# Patient Record
Sex: MALE | Race: WHITE | NOT HISPANIC OR LATINO
[De-identification: names, ages, dates, MRNs, and addresses within clinical notes are randomized per-mention and may not be internally consistent; named-entity substitution may affect disease eponyms.]

---

## 2017-10-27 ENCOUNTER — TRANSCRIPTION ENCOUNTER (OUTPATIENT)
Age: 22
End: 2017-10-27

## 2020-05-25 ENCOUNTER — TRANSCRIPTION ENCOUNTER (OUTPATIENT)
Age: 25
End: 2020-05-25

## 2020-08-25 ENCOUNTER — OUTPATIENT (OUTPATIENT)
Dept: OUTPATIENT SERVICES | Facility: HOSPITAL | Age: 25
LOS: 1 days | End: 2020-08-25
Payer: COMMERCIAL

## 2020-08-25 DIAGNOSIS — Z11.59 ENCOUNTER FOR SCREENING FOR OTHER VIRAL DISEASES: ICD-10-CM

## 2020-08-25 LAB — SARS-COV-2 RNA SPEC QL NAA+PROBE: SIGNIFICANT CHANGE UP

## 2020-08-25 PROCEDURE — U0003: CPT

## 2020-08-26 DIAGNOSIS — Z11.59 ENCOUNTER FOR SCREENING FOR OTHER VIRAL DISEASES: ICD-10-CM

## 2020-12-12 ENCOUNTER — TRANSCRIPTION ENCOUNTER (OUTPATIENT)
Age: 25
End: 2020-12-12

## 2021-01-06 ENCOUNTER — TRANSCRIPTION ENCOUNTER (OUTPATIENT)
Age: 26
End: 2021-01-06

## 2021-01-08 ENCOUNTER — TRANSCRIPTION ENCOUNTER (OUTPATIENT)
Age: 26
End: 2021-01-08

## 2021-01-09 ENCOUNTER — EMERGENCY (EMERGENCY)
Facility: HOSPITAL | Age: 26
LOS: 1 days | End: 2021-01-09
Attending: EMERGENCY MEDICINE
Payer: MEDICAID

## 2021-01-09 ENCOUNTER — TRANSCRIPTION ENCOUNTER (OUTPATIENT)
Age: 26
End: 2021-01-09

## 2021-01-09 VITALS
DIASTOLIC BLOOD PRESSURE: 71 MMHG | HEART RATE: 81 BPM | TEMPERATURE: 98 F | SYSTOLIC BLOOD PRESSURE: 109 MMHG | RESPIRATION RATE: 18 BRPM | HEIGHT: 71 IN | WEIGHT: 169.98 LBS | OXYGEN SATURATION: 97 %

## 2021-01-09 LAB
ALBUMIN SERPL ELPH-MCNC: 4.2 G/DL — SIGNIFICANT CHANGE UP (ref 3.3–5)
ALP SERPL-CCNC: 44 U/L — SIGNIFICANT CHANGE UP (ref 40–120)
ALT FLD-CCNC: 15 U/L — SIGNIFICANT CHANGE UP (ref 10–45)
ANION GAP SERPL CALC-SCNC: 10 MMOL/L — SIGNIFICANT CHANGE UP (ref 5–17)
APTT BLD: 32.4 SEC — SIGNIFICANT CHANGE UP (ref 27.5–35.5)
AST SERPL-CCNC: 16 U/L — SIGNIFICANT CHANGE UP (ref 10–40)
BASOPHILS # BLD AUTO: 0.04 K/UL — SIGNIFICANT CHANGE UP (ref 0–0.2)
BASOPHILS NFR BLD AUTO: 0.3 % — SIGNIFICANT CHANGE UP (ref 0–2)
BILIRUB SERPL-MCNC: 0.4 MG/DL — SIGNIFICANT CHANGE UP (ref 0.2–1.2)
BLD GP AB SCN SERPL QL: NEGATIVE — SIGNIFICANT CHANGE UP
BUN SERPL-MCNC: 7 MG/DL — SIGNIFICANT CHANGE UP (ref 7–23)
CALCIUM SERPL-MCNC: 9.5 MG/DL — SIGNIFICANT CHANGE UP (ref 8.4–10.5)
CHLORIDE SERPL-SCNC: 103 MMOL/L — SIGNIFICANT CHANGE UP (ref 96–108)
CO2 SERPL-SCNC: 27 MMOL/L — SIGNIFICANT CHANGE UP (ref 22–31)
CREAT SERPL-MCNC: 0.74 MG/DL — SIGNIFICANT CHANGE UP (ref 0.5–1.3)
EOSINOPHIL # BLD AUTO: 0.03 K/UL — SIGNIFICANT CHANGE UP (ref 0–0.5)
EOSINOPHIL NFR BLD AUTO: 0.3 % — SIGNIFICANT CHANGE UP (ref 0–6)
GLUCOSE SERPL-MCNC: 67 MG/DL — LOW (ref 70–99)
HCT VFR BLD CALC: 45.4 % — SIGNIFICANT CHANGE UP (ref 39–50)
HGB BLD-MCNC: 14.9 G/DL — SIGNIFICANT CHANGE UP (ref 13–17)
IMM GRANULOCYTES NFR BLD AUTO: 0.3 % — SIGNIFICANT CHANGE UP (ref 0–1.5)
INR BLD: 1.18 RATIO — HIGH (ref 0.88–1.16)
LYMPHOCYTES # BLD AUTO: 1.97 K/UL — SIGNIFICANT CHANGE UP (ref 1–3.3)
LYMPHOCYTES # BLD AUTO: 16.9 % — SIGNIFICANT CHANGE UP (ref 13–44)
MCHC RBC-ENTMCNC: 31 PG — SIGNIFICANT CHANGE UP (ref 27–34)
MCHC RBC-ENTMCNC: 32.8 GM/DL — SIGNIFICANT CHANGE UP (ref 32–36)
MCV RBC AUTO: 94.6 FL — SIGNIFICANT CHANGE UP (ref 80–100)
MONOCYTES # BLD AUTO: 1.14 K/UL — HIGH (ref 0–0.9)
MONOCYTES NFR BLD AUTO: 9.8 % — SIGNIFICANT CHANGE UP (ref 2–14)
NEUTROPHILS # BLD AUTO: 8.42 K/UL — HIGH (ref 1.8–7.4)
NEUTROPHILS NFR BLD AUTO: 72.4 % — SIGNIFICANT CHANGE UP (ref 43–77)
NRBC # BLD: 0 /100 WBCS — SIGNIFICANT CHANGE UP (ref 0–0)
PLATELET # BLD AUTO: 242 K/UL — SIGNIFICANT CHANGE UP (ref 150–400)
POTASSIUM SERPL-MCNC: 3.8 MMOL/L — SIGNIFICANT CHANGE UP (ref 3.5–5.3)
POTASSIUM SERPL-SCNC: 3.8 MMOL/L — SIGNIFICANT CHANGE UP (ref 3.5–5.3)
PROT SERPL-MCNC: 7.6 G/DL — SIGNIFICANT CHANGE UP (ref 6–8.3)
PROTHROM AB SERPL-ACNC: 14 SEC — HIGH (ref 10.6–13.6)
RBC # BLD: 4.8 M/UL — SIGNIFICANT CHANGE UP (ref 4.2–5.8)
RBC # FLD: 12.9 % — SIGNIFICANT CHANGE UP (ref 10.3–14.5)
RH IG SCN BLD-IMP: POSITIVE — SIGNIFICANT CHANGE UP
SARS-COV-2 RNA SPEC QL NAA+PROBE: SIGNIFICANT CHANGE UP
SODIUM SERPL-SCNC: 140 MMOL/L — SIGNIFICANT CHANGE UP (ref 135–145)
WBC # BLD: 11.64 K/UL — HIGH (ref 3.8–10.5)
WBC # FLD AUTO: 11.64 K/UL — HIGH (ref 3.8–10.5)

## 2021-01-09 PROCEDURE — 73090 X-RAY EXAM OF FOREARM: CPT | Mod: 26,LT

## 2021-01-09 PROCEDURE — 73110 X-RAY EXAM OF WRIST: CPT | Mod: 26,LT

## 2021-01-09 PROCEDURE — 73130 X-RAY EXAM OF HAND: CPT | Mod: 26,LT

## 2021-01-09 PROCEDURE — 99218: CPT

## 2021-01-09 RX ORDER — CLONAZEPAM 1 MG
0.5 TABLET ORAL
Refills: 0 | Status: COMPLETED | OUTPATIENT
Start: 2021-01-09 | End: 2021-01-16

## 2021-01-09 RX ORDER — VANCOMYCIN HCL 1 G
1000 VIAL (EA) INTRAVENOUS ONCE
Refills: 0 | Status: COMPLETED | OUTPATIENT
Start: 2021-01-09 | End: 2021-01-09

## 2021-01-09 RX ORDER — IBUPROFEN 200 MG
600 TABLET ORAL EVERY 6 HOURS
Refills: 0 | Status: DISCONTINUED | OUTPATIENT
Start: 2021-01-09 | End: 2021-01-13

## 2021-01-09 RX ADMIN — Medication 600 MILLIGRAM(S): at 21:35

## 2021-01-09 RX ADMIN — Medication 0.5 MILLIGRAM(S): at 19:53

## 2021-01-09 RX ADMIN — Medication 250 MILLIGRAM(S): at 18:30

## 2021-01-09 RX ADMIN — Medication 100 MILLIGRAM(S): at 16:58

## 2021-01-09 NOTE — ED ADULT TRIAGE NOTE - PAIN: PRESENCE, MLM
Same Day Surgery Discharge Instructions  Special Precautions After Surgery - Adult    1. It is not unusual to feel lightheaded or faint, up to 24 hours after surgery or while taking pain medication.  If you have these symptoms; sit for a few minutes before standing and have someone assist you when getting up.  2. You should rest and relax for the next 24 hours and must have someone stay with you for at least 24 hours after your discharge.  3. DO NOT DRIVE any vehicle or operate mechanical equipment for 24 hours following the end of your surgery.  DO NOT DRIVE while taking narcotic pain medications that have been prescribed by your physician.  If you had a limb operated on, you must be able to use it fully to drive.  4. DO NOT drink alcoholic beverages for 24 hours following surgery or while taking prescription pain medication.  5. Drink clear liquids (apple juice, ginger ale, broth, 7-Up, etc.).  Progress to your regular diet as you feel able.  6. Any questions call your physician and do not make important decisions for 24 hours.      __________________________________________________________________________________________________________________________________  IMPORTANT NUMBERS:    [facility] [place]:  [phone], [phone]  Pharmacy:  [phone]  Same Day Surgery:  [phone], Monday - Friday until 8:30 p.m.  Urgent Care:  [phone]  Emergency Room:  [phone]      [facility]:  [phone]                                                                             [facility]:  [phone] option 1  [facility]:  [phone]     [facility]:  [phone]   [facility]:  [phone]   Home Medical Equipment: [phone]  [facility]:  [phone]      
complains of pain/discomfort

## 2021-01-09 NOTE — ED ADULT NURSE NOTE - OBJECTIVE STATEMENT
25 year old male presents ambulatory to ED through waiting room complaining of "cellulitis" with abscess to left wrist. PMH anxiety. States about one week ago he noticed a small pimple on his wrist that he started picking at, it became worse - went to urgent care who started him on keflex about 4-5 days ago and they lanced it with no reported drainage however states since then it has become larger and red. Sizeable abscess noted to wrist with swelling to entire left hand. Denies headache, chest pain, shortness of breath, abd pain, NVD, fevers, chills.

## 2021-01-09 NOTE — ED PROVIDER NOTE - ATTENDING CONTRIBUTION TO CARE
I performed a history and physical exam of the patient and discussed their management with the resident. I reviewed the resident's note and agree with the documented findings and plan of care.  Tashia Cabrera MD

## 2021-01-09 NOTE — ED CDU PROVIDER INITIAL DAY NOTE - PROGRESS NOTE DETAILS
Pt arrived in CDU. Seen at bedside in NAD.  VSS.  Resting comfortably without complaints. States pain in hand resolved at the moment, still has some numbness over I&D site. Dressing clean/dry/intact. Hand w/ swelling to dorsal aspect but full AROM. No erythema extension up arm. Sensation intact. Cap refill <2 seconds all digits.  Xray resulted while pt was in CDU, noted soft tissue swelling along dorsal aspect of hand and wrist w/ focal areas of lucency concerning for air. ED attending Dr. Dumas made aware of this result, states xray was performed after I&D by Dr. Farias so air no unexpected, recommended no change in or additional management at this time, continue IV abx and observation. Pt confirmed xray performed after procedure. Will continue to monitor. - Vicente Horta PA-C

## 2021-01-09 NOTE — ED CDU PROVIDER INITIAL DAY NOTE - OBJECTIVE STATEMENT
25 Y M presenting with 5 days of L. hand swelling. States L. hand began swelling after picking a hair follicle, given two days of keflex by urgent care 3 days ago with no improvement, denies any fever, chills, diarrhea, SOB, hand tenderness, change in sensation, motion,  strength. 25 Y M presenting with 5 days of L. hand swelling. States L. hand began swelling after picking a hair follicle, given two days of keflex by urgent care 3 days ago with no improvement, denies any fever, chills, diarrhea, SOB, hand tenderness, change in sensation, motion,  strength.    In ED labs notable for WBC 11.64, CMP wnl. Hand surgeon Dr. Farias was consulted and evaluated pt in ED, performed I&D at bedside. Xray was then performed which noted soft tissue swelling along dorsal aspect of hand and wrist w/ focal areas of lucency concerning for air, however this was performed after I&D, attending Dr. Dumas made aware and no change in management at this time given likely post-procedural. Pt given IV vanco 1g x 1 and IV clindamycin 600 mg. Sent to CDU for continued monitoring, frequent re-evaluations, and continued IV abx. Case d/w ED attending.

## 2021-01-09 NOTE — ED PROVIDER NOTE - NS ED ROS FT
GENERAL: No fever or chills  EYES: No change in vision  HEENT: No trouble swallowing or speaking  CARDIAC: No chest pain  PULMONARY: No cough or SOB  GI: No abdominal pain, no nausea or no vomiting, no diarrhea or constipation  : No changes in urination  SKIN: + erythema over L. wrist,   NEURO: No headache, no numbness  MSK: No joint pain  Otherwise as HPI or negative.

## 2021-01-09 NOTE — ED ADULT NURSE REASSESSMENT NOTE - NS ED NURSE REASSESS COMMENT FT1
Received pt from [facility] , received pt alert and responsive, oriented x4, denies any respiratory distress, SOB, or difficulty breathing. Pt transferred to CDU for  L wrist abscess. Drained in main ED, site covered with gauze. + edema noted to L hand. Pt denies pain at this time. Pending IV clindamycin q8 t aware of plan. IV in place, patent and free of signs of infiltration, ppt denies chest pain or palpitations, V/S stable, pt afebrile, pt denies pain at this time. Pt educated on unit and unit rules, instructed patient to notify RN of any needed assistance, Pt verbalizes understanding, Call bell placed within reach. Safety maintained. Will continue to monitor.

## 2021-01-09 NOTE — ED ADULT TRIAGE NOTE - CHIEF COMPLAINT QUOTE
c/o left hand bump/ swelling x 1 week, went to urgent care, sent to ER for cellulitis, on cephalexin

## 2021-01-09 NOTE — ED PROVIDER NOTE - PHYSICAL EXAMINATION
Physical Exam:  General: NAD, Conversive  Eyes: EOMI, Conjunctia and sclera clear  Neck: No JVD  Abdomen: Soft, nontender, nondistended, no CVA tenderness  Extremities: 2+ peripheral pulses, L. hand swelling over radial side with 3cm by 3cm abscess, no resting flexion, pain with extension, or tenderness over tendon palpation  Psych: AAO X3  Neurologic: Non-focal

## 2021-01-09 NOTE — ED PROVIDER NOTE - OBJECTIVE STATEMENT
25 Y M presenting with 5 days of L. hand swelling. States L. hand began swelling after picking a hair follicle, given two days of keflex by urgent care 3 days ago with no improvement, denies any fever, chills, diarrhea, SOB, hand tenderness, change in sensation, motion,  strength.

## 2021-01-09 NOTE — ED CDU PROVIDER INITIAL DAY NOTE - MEDICAL DECISION MAKING DETAILS
Dina Dumas MD - Attending Physician: L hand/wrist abscess of significant size. I+D performed by Dr Farias. No concern for deep infection at this time. IV abx, frequent re-evals. Likely DC in am if remains stable

## 2021-01-09 NOTE — ED PROVIDER NOTE - CLINICAL SUMMARY MEDICAL DECISION MAKING FREE TEXT BOX
25 Y M presenting with L. hand abscess, no systemic signs of infection, no signs of tenosynovitis (resting flexion, pain with extension, tenderness over tendon palpation). common labs, Antibiotics, I&D, CDU.

## 2021-01-09 NOTE — ED PROVIDER NOTE - SHIFT CHANGE DETAILS
Dina Dumas MD - Attending Physician: L wrist abscess, with dependent swelling to hand. Able to range wrist, but significant tenderness over lateral carpal bones. IV abx, Hand, imaging pending

## 2021-01-10 VITALS
RESPIRATION RATE: 16 BRPM | HEART RATE: 69 BPM | SYSTOLIC BLOOD PRESSURE: 108 MMHG | DIASTOLIC BLOOD PRESSURE: 65 MMHG | OXYGEN SATURATION: 97 % | TEMPERATURE: 98 F

## 2021-01-10 LAB
BASOPHILS # BLD AUTO: 0.04 K/UL — SIGNIFICANT CHANGE UP (ref 0–0.2)
BASOPHILS NFR BLD AUTO: 0.4 % — SIGNIFICANT CHANGE UP (ref 0–2)
EOSINOPHIL # BLD AUTO: 0.11 K/UL — SIGNIFICANT CHANGE UP (ref 0–0.5)
EOSINOPHIL NFR BLD AUTO: 1.2 % — SIGNIFICANT CHANGE UP (ref 0–6)
HCT VFR BLD CALC: 43.3 % — SIGNIFICANT CHANGE UP (ref 39–50)
HGB BLD-MCNC: 14.2 G/DL — SIGNIFICANT CHANGE UP (ref 13–17)
IMM GRANULOCYTES NFR BLD AUTO: 0.4 % — SIGNIFICANT CHANGE UP (ref 0–1.5)
LYMPHOCYTES # BLD AUTO: 2.23 K/UL — SIGNIFICANT CHANGE UP (ref 1–3.3)
LYMPHOCYTES # BLD AUTO: 24.9 % — SIGNIFICANT CHANGE UP (ref 13–44)
MCHC RBC-ENTMCNC: 31 PG — SIGNIFICANT CHANGE UP (ref 27–34)
MCHC RBC-ENTMCNC: 32.8 GM/DL — SIGNIFICANT CHANGE UP (ref 32–36)
MCV RBC AUTO: 94.5 FL — SIGNIFICANT CHANGE UP (ref 80–100)
MONOCYTES # BLD AUTO: 1.01 K/UL — HIGH (ref 0–0.9)
MONOCYTES NFR BLD AUTO: 11.3 % — SIGNIFICANT CHANGE UP (ref 2–14)
NEUTROPHILS # BLD AUTO: 5.51 K/UL — SIGNIFICANT CHANGE UP (ref 1.8–7.4)
NEUTROPHILS NFR BLD AUTO: 61.8 % — SIGNIFICANT CHANGE UP (ref 43–77)
NRBC # BLD: 0 /100 WBCS — SIGNIFICANT CHANGE UP (ref 0–0)
PLATELET # BLD AUTO: 246 K/UL — SIGNIFICANT CHANGE UP (ref 150–400)
RBC # BLD: 4.58 M/UL — SIGNIFICANT CHANGE UP (ref 4.2–5.8)
RBC # FLD: 13.1 % — SIGNIFICANT CHANGE UP (ref 10.3–14.5)
WBC # BLD: 8.94 K/UL — SIGNIFICANT CHANGE UP (ref 3.8–10.5)
WBC # FLD AUTO: 8.94 K/UL — SIGNIFICANT CHANGE UP (ref 3.8–10.5)

## 2021-01-10 PROCEDURE — 96375 TX/PRO/DX INJ NEW DRUG ADDON: CPT

## 2021-01-10 PROCEDURE — 99284 EMERGENCY DEPT VISIT MOD MDM: CPT | Mod: 25

## 2021-01-10 PROCEDURE — U0005: CPT

## 2021-01-10 PROCEDURE — 96376 TX/PRO/DX INJ SAME DRUG ADON: CPT

## 2021-01-10 PROCEDURE — 86900 BLOOD TYPING SEROLOGIC ABO: CPT

## 2021-01-10 PROCEDURE — 86850 RBC ANTIBODY SCREEN: CPT

## 2021-01-10 PROCEDURE — 85025 COMPLETE CBC W/AUTO DIFF WBC: CPT

## 2021-01-10 PROCEDURE — 86901 BLOOD TYPING SEROLOGIC RH(D): CPT

## 2021-01-10 PROCEDURE — 85730 THROMBOPLASTIN TIME PARTIAL: CPT

## 2021-01-10 PROCEDURE — 80053 COMPREHEN METABOLIC PANEL: CPT

## 2021-01-10 PROCEDURE — G0378: CPT

## 2021-01-10 PROCEDURE — 73090 X-RAY EXAM OF FOREARM: CPT

## 2021-01-10 PROCEDURE — 73110 X-RAY EXAM OF WRIST: CPT

## 2021-01-10 PROCEDURE — 73130 X-RAY EXAM OF HAND: CPT

## 2021-01-10 PROCEDURE — 99217: CPT

## 2021-01-10 PROCEDURE — 96365 THER/PROPH/DIAG IV INF INIT: CPT

## 2021-01-10 PROCEDURE — 85610 PROTHROMBIN TIME: CPT

## 2021-01-10 PROCEDURE — U0003: CPT

## 2021-01-10 RX ORDER — OXYCODONE HYDROCHLORIDE 5 MG/1
5 TABLET ORAL ONCE
Refills: 0 | Status: DISCONTINUED | OUTPATIENT
Start: 2021-01-10 | End: 2021-01-10

## 2021-01-10 RX ADMIN — Medication 600 MILLIGRAM(S): at 00:32

## 2021-01-10 RX ADMIN — Medication 100 MILLIGRAM(S): at 07:44

## 2021-01-10 RX ADMIN — Medication 100 MILLIGRAM(S): at 00:02

## 2021-01-10 RX ADMIN — Medication 0.5 MILLIGRAM(S): at 04:18

## 2021-01-10 RX ADMIN — Medication 600 MILLIGRAM(S): at 07:47

## 2021-01-10 RX ADMIN — OXYCODONE HYDROCHLORIDE 5 MILLIGRAM(S): 5 TABLET ORAL at 00:11

## 2021-01-10 NOTE — ED CDU PROVIDER DISPOSITION NOTE - PATIENT PORTAL LINK FT
You can access the FollowMyHealth Patient Portal offered by [facility] by registering at the following website: [email or web address]. By joining [employer]’s FollowMyHealth portal, you will also be able to view your health information using other applications (apps) compatible with our system.

## 2021-01-10 NOTE — ED CDU PROVIDER SUBSEQUENT DAY NOTE - HISTORY
26 yo male currently being observed overnight in CDU for L hand cellulitis s/p I&D. Patient seen at bedside in NAD.  VSS.  Patient resting comfortably though endorsing 7/10 hand pain. Had motrin earlier which resolved pain but is wearing off. Bandage taken down at bedside to assess wound. +1 cm central opening w/ packing in place, +surrounding erythema and swelling. No extension of erythema up forearm. +Full AROM wrist and all joints of L hand. Strength intact. Sensation intact. cap refill <2 sec all digits. Denies numbness/tingling, fever/chills. Appears well. Will give 1 time dose oxycodone given pain level and continue to monitor. - Vicente Horta PA-C

## 2021-01-10 NOTE — ED CDU PROVIDER DISPOSITION NOTE - CLINICAL COURSE
24 yo male PMHx anxiety presented to the ED c/o 5 days of left hand swelling which began after picking a hair follice. Went to urgen care 3 days ago once noticed redness/swelling, was given Keflex which he's been taking without improvement. Denied any fever, chills, diarrhea, SOB, hand tenderness, change in sensation, motion,  strength.  ED Course: WBC 11.64, CMP wnl. Hand surgeon Dr. Farias was consulted and evaluated pt in ED, performed I&D at bedside and packed wound. Xray of the L hand/wrist/forearm was then performed which noted Soft tissue swelling greatest along the dorsal aspect of the hand and wrist with focal areas of lucency consistent with air and likely related to soft tissue injury. No radiographic evidence of osteomyelitis. Pt given IV vanco 1g x 1 dose and IV clindamycin 600 mg and was sent to CDU for continued monitoring, frequent re-evaluations, and continued IV abx.   CDU Course: Pt did well in CDU overnight. He had intermittent pain overnight that was alleviated with analgesics. Erythema did not worsen/extend overnight. His vital signs remained stable and he was afebrile overnight. Repeat WBC in AM was ___________. In the AM _____________. 26 yo male PMHx anxiety presented to the ED c/o 5 days of left hand swelling which began after picking a hair follice. Went to urgen care 3 days ago once noticed redness/swelling, was given Keflex which he's been taking without improvement. Denied any fever, chills, diarrhea, SOB, hand tenderness, change in sensation, motion,  strength.  ED Course: WBC 11.64, CMP wnl. Hand surgeon Dr. Farias was consulted and evaluated pt in ED, performed I&D at bedside and packed wound. Xray of the L hand/wrist/forearm was then performed which noted Soft tissue swelling greatest along the dorsal aspect of the hand and wrist with focal areas of lucency consistent with air and likely related to soft tissue injury. No radiographic evidence of osteomyelitis. Pt given IV vanco 1g x 1 dose and IV clindamycin 600 mg and was sent to CDU for continued monitoring, frequent re-evaluations, and continued IV abx.   CDU Course: Pt did well in CDU overnight. He had intermittent pain overnight that was alleviated with analgesics. Erythema did not worsen/extend overnight. His vital signs remained stable and he was afebrile overnight. Repeat WBC in AM was 8.94. In the AM _____________. 24 yo male PMHx anxiety presented to the ED c/o 5 days of left hand swelling which began after picking a hair follice. Went to urgen care 3 days ago once noticed redness/swelling, was given Keflex which he's been taking without improvement. Denied any fever, chills, diarrhea, SOB, hand tenderness, change in sensation, motion,  strength.  ED Course: WBC 11.64, CMP wnl. Hand surgeon Dr. Farias was consulted and evaluated pt in ED, performed I&D at bedside and packed wound. Xray of the L hand/wrist/forearm was then performed which noted Soft tissue swelling greatest along the dorsal aspect of the hand and wrist with focal areas of lucency consistent with air and likely related to soft tissue injury. No radiographic evidence of osteomyelitis. Pt given IV vanco 1g x 1 dose and IV clindamycin 600 mg and was sent to CDU for continued monitoring, frequent re-evaluations, and continued IV abx.   CDU Course: Pt did well in CDU overnight. He had intermittent pain overnight that was alleviated with analgesics. Erythema did not worsen/extend overnight. His vital signs remained stable and he was afebrile overnight. Repeat WBC in AM was 8.94. Patient continued to improve.  Full ROM of the left hand and wrist.  Will DC home with close hand surgery follow up and strict return precautions.

## 2021-01-10 NOTE — ED CDU PROVIDER SUBSEQUENT DAY NOTE - UPPER EXTREMITY EXAM, LEFT
+swelling and erythema noted dorsal/radial aspect of L hand with +1cm x 1 cm central opening w/ packing in place, +surrounding induration, area of erythema 3cm x 3cm with overlying ttp. No tracking of erythema up arm. Compartments of arm are soft/compressible. +Full AROM wrist and all digits L hand though reports pain w/ extension at wrist. Sensation intact. Cap refill <2 sec all digits.

## 2021-01-10 NOTE — ED CDU PROVIDER SUBSEQUENT DAY NOTE - SKIN WOUND TYPE
+3cm x 3cm area of erythema and swelling noted to dorsal/radial aspect of L hand with 1cm x 1cm central opening w/ packing in place.

## 2021-01-10 NOTE — ED CDU PROVIDER DISPOSITION NOTE - NSFOLLOWUPINSTRUCTIONS_ED_ALL_ED_FT
1. Follow up with your primary doctor within 1-2 days. Additionally please follow up with hand surgeon Dr. Timmy Farias [phone] within 1-2 days for further evaluation/management regarding your symptoms.  2. Keep dressing in place for 24 hours. Keep packing in place until being re-evaluated by Dr. Farias in the office.  3. Rest and elevate affected area. Take Motrin 600 mg every 8 hours with food for pain. Take Tylenol 650 mg every 6 hours for additional pain relief.   4. Take Clindamycin as prescribed. Recommend taking daily probiotic while taking Clindamycin.  5. If you develop any worsening redness, swelling, streaking (red lines), fever, chills or any other concerning symptoms please return to the Emergency Department immediately. 1. Follow up with your primary doctor within 1-2 days. Additionally please follow up with hand surgeon Dr. Timmy Farias [phone] within 1-2 days for further evaluation/management regarding your symptoms.  2. Keep dressing in place for 24 hours. Keep packing in place until being re-evaluated by Dr. Farias in the office.  3. Rest and elevate affected area. Take Motrin 600 mg every 6 hours with food for pain. Take Tylenol 650 mg every 6 hours for additional pain relief.   4. Take Clindamycin as prescribed. Recommend taking daily probiotic while taking Clindamycin.  5. If you develop any worsening redness, swelling, streaking (red lines), fever, chills or any other concerning symptoms please return to the Emergency Department immediately. 1. Follow up with your primary doctor within 1-2 days. Additionally please follow up with hand surgeon Dr. Timmy Farias [phone] Monday or Wednesday for further evaluation/management.  Call office tomorrow morning to schedule an appointment.  2. Keep dressing in place for 24 hours. Remove packing tomorrow and wash in warm soapy water daily.  3. Rest and elevate affected area. Take Motrin 600 mg every 6 hours with food for pain. Take Tylenol 650 mg every 6 hours for additional pain relief.   4. Take Clindamycin as prescribed. Recommend taking daily probiotic while taking Clindamycin.  5. If you develop any worsening redness, swelling, streaking (red lines), fever, chills or any other concerning symptoms please return to the Emergency Department immediately.

## 2021-01-10 NOTE — ED CDU PROVIDER SUBSEQUENT DAY NOTE - PROGRESS NOTE DETAILS
Patient seen at bedside in NAD.  VSS.  Patient resting comfortably without complaints. No interval changes from previous progress note. Pt was given oxycodone earlier for his 7/10 pain which completely resolved pain. Feels well, denies current pain, fever/chills, numbness/tingling. Dressing clean/dry/intact. +swelling to dorsum of hand relatively unchanged from previous. No extension of erythema up arm. ROM L hand/wrist intact. Sensation intact, cap refill <2 sec. Will continue to monitor. - Vicente Horta PA-C Patient seen at bedside in NAD.  VSS.  Patient resting comfortably.  Patient endorsing some left hand pain, but otherwise feels improved.  S/p I&D yesterday by hand surgery.  no fevers overnight.  WIll likely DC with PO clindamycin and hand surgery follow up.  -Tylor Petty PA-C Discussed with Dr. Farias.  Agreeable with MO home.  Recommending change packing now.  Have patient remove packing tomorrow and patient should follow up in his office Monday or Wednesday this week.  -Tylor Petty PA-C

## 2021-01-10 NOTE — ED CDU PROVIDER DISPOSITION NOTE - CARE PROVIDER_API CALL
Timmy Farias  PLASTIC SURGERY  [address] [place] [address]  Phone: [phone]  Fax: [phone]  Follow Up Time:

## 2021-12-08 ENCOUNTER — EMERGENCY (EMERGENCY)
Facility: HOSPITAL | Age: 26
LOS: 1 days | Discharge: HOME | End: 2021-12-08
Attending: EMERGENCY MEDICINE
Payer: MEDICAID

## 2021-12-08 VITALS
SYSTOLIC BLOOD PRESSURE: 120 MMHG | DIASTOLIC BLOOD PRESSURE: 83 MMHG | TEMPERATURE: 98 F | HEART RATE: 96 BPM | RESPIRATION RATE: 20 BRPM | OXYGEN SATURATION: 100 % | HEIGHT: 71 IN

## 2021-12-08 VITALS
HEART RATE: 87 BPM | TEMPERATURE: 98 F | SYSTOLIC BLOOD PRESSURE: 106 MMHG | DIASTOLIC BLOOD PRESSURE: 68 MMHG | RESPIRATION RATE: 17 BRPM | OXYGEN SATURATION: 100 %

## 2021-12-08 PROBLEM — Z78.9 OTHER SPECIFIED HEALTH STATUS: Chronic | Status: ACTIVE | Noted: 2021-01-09

## 2021-12-08 PROCEDURE — 99283 EMERGENCY DEPT VISIT LOW MDM: CPT | Mod: 25

## 2021-12-08 PROCEDURE — 99284 EMERGENCY DEPT VISIT MOD MDM: CPT

## 2021-12-08 PROCEDURE — 73030 X-RAY EXAM OF SHOULDER: CPT

## 2021-12-08 PROCEDURE — 73030 X-RAY EXAM OF SHOULDER: CPT | Mod: 26,LT

## 2021-12-08 RX ORDER — ACETAMINOPHEN 500 MG
650 TABLET ORAL ONCE
Refills: 0 | Status: COMPLETED | OUTPATIENT
Start: 2021-12-08 | End: 2021-12-08

## 2021-12-08 RX ORDER — IBUPROFEN 200 MG
400 TABLET ORAL ONCE
Refills: 0 | Status: COMPLETED | OUTPATIENT
Start: 2021-12-08 | End: 2021-12-08

## 2021-12-08 RX ADMIN — Medication 650 MILLIGRAM(S): at 13:50

## 2021-12-08 RX ADMIN — Medication 400 MILLIGRAM(S): at 13:50

## 2021-12-08 NOTE — ED PROVIDER NOTE - CLINICAL SUMMARY MEDICAL DECISION MAKING FREE TEXT BOX
25yM w/ L shoulder pain over last 2 weeks, VSS, exam shows stable shoulder without weakness or infectious appearance. XRay is negative for acute findings, not dislocated. Will DC and have patient follow up with orthopedics for further evaluation.     Will discharge. Discussed the case with patient and/or family.  Instructed urgent follow up with primary care doctor for review of their ED visit (labs, radiology, etc.) Also instructed follow up for any specialty services as needed. Patient and/or family are aware to return to ED with any new or worsening symptoms. All questions were answered and the opportunity for to ask questions were given. Patient and/or family verbalized understanding of the above instructions. 25yM w/ L shoulder pain over last 2 weeks, VSS, exam shows stable shoulder without weakness or infectious appearance. XRay is negative for acute findings, not dislocated. Will DC and have patient follow up with orthopedics for further evaluation.  --BMM    Will discharge. Discussed the case with patient and/or family.  Instructed urgent follow up with primary care doctor for review of their ED visit (labs, radiology, etc.) Also instructed follow up for any specialty services as needed. Patient and/or family are aware to return to ED with any new or worsening symptoms. All questions were answered and the opportunity for to ask questions were given. Patient and/or family verbalized understanding of the above instructions.

## 2021-12-08 NOTE — ED PROVIDER NOTE - CARE PROVIDER_API CALL
Ferdinand Montanez  ORTHOPAEDIC SURGERY  [address] [place] [address]  Phone: [phone]  Fax: [phone]  Follow Up Time:

## 2021-12-08 NOTE — ED ADULT TRIAGE NOTE - CHIEF COMPLAINT QUOTE
pt states when stretching few months ago felt pop in shoulder discomfort went away yesterday pain while heavy lifting at work

## 2021-12-08 NOTE — ED PROVIDER NOTE - PHYSICAL EXAMINATION
I have review the triage vital signs   Const: Well nourished and developed. Appears stated age. Awake, alert, in no acute distress.  Head: Normocephalic and atraumatic.  Eyes: No conjunctival pallor, PERRL, EOMI  Ear, Nose, Throat: Normal appearing externally. No external throat swelling.  Neck: Ranging without difficulty or pain.   Respiratory: No acute respiratory distress. Lungs CTAB.  Cardiovascular: Regular rate and rhythm. No edema.   Abdominal: Soft, nontender, nondistended. No rebound or guarding.  MSK: No gross deformity, minor tenderness to palpation over anterior shoulder joint and into the trapezius. Full shoulder ROM in flexion, ER, IR. Str5/5 throughout. Alejandro negative, neer negative, loja negative, cross arm negative, jefferson negative.   Neuro: The patient is alert, conversive, moving all extremities equally and spontaneously   Skin: Warm, dry, intact. I have review the triage vital signs   Const: Well nourished and developed. Appears stated age. Awake, alert, in no acute distress.  Head: Normocephalic and atraumatic.  Eyes: No conjunctival pallor, PERRL, EOMI  Ear, Nose, Throat: Normal appearing externally. No external throat swelling.  Neck: Ranging without difficulty or pain.   Respiratory: No acute respiratory distress. Lungs CTAB.  Cardiovascular: Regular rate and rhythm. No edema.   Abdominal: Soft, nontender, nondistended. No rebound or guarding.  MSK: No gross deformity, minor tenderness to palpation over anterior shoulder joint and into the trapezius. Full shoulder ROM in flexion, ER, IR. Str5/5 throughout. Alejandro negative, neer negative, loja negative, cross arm negative, jefferson negative. Yergason and speed negative.   Neuro: The patient is alert, conversive, moving all extremities equally and spontaneously   Skin: Warm, dry, intact.

## 2021-12-08 NOTE — ED PROVIDER NOTE - NSFOLLOWUPINSTRUCTIONS_ED_ALL_ED_FT
Please follow up with your primary care doctor as soon as possible for review of your ED visit.   Please return to the ED if you develop any new or worsening symptoms., including redness, swelling, fever, chills, inability to range the shoulder. Please follow up with orthopedics as below

## 2021-12-08 NOTE — ED PROVIDER NOTE - CARE PROVIDERS DIRECT ADDRESSES
Pt aware urine sample needed. Unable to provide at this time. RN will follow up, pt knows to call when able to provide sample.   
[email or web address]

## 2021-12-08 NOTE — ED PROVIDER NOTE - CHIEF COMPLAINT
Patient came in yesterday and gave a urine specimen for a drug screen. The patient is a 25y Male complaining of

## 2021-12-08 NOTE — ED PROVIDER NOTE - NS ED ROS FT
ROS:  GENERAL: No fever, no chills  EYES: no change in vision  HEENT: no trouble swallowing, no trouble speaking  CARDIAC: no chest pain  PULMONARY: no cough, no shortness of breath  GI: no abdominal pain, no nausea, no vomiting, no diarrhea, no constipation  : No dysuria, no frequency, no change in appearance, or odor of urine  SKIN: no rashes  NEURO: no headache, no weakness  MSK: + L shoulder joint pain.

## 2021-12-08 NOTE — ED PROVIDER NOTE - PATIENT PORTAL LINK FT
You can access the FollowMyHealth Patient Portal offered by [facility] by registering at the following website: [email or web address]. By joining [employer]’s FollowMyHealth portal, you will also be able to view your health information using other applications (apps) compatible with our system.

## 2021-12-08 NOTE — ED PROVIDER NOTE - OBJECTIVE STATEMENT
25yM p/w L shoulder pain. Patient reports 2-3 months ago he was stretching his shoulder (forward flexion with arms crossed) when he heard a pop in the L shoulder. At that time he was concerned that he dislocated but he heard a second pop and no limited range of motion so he watched it. He reports that 25yM p/w L shoulder pain. Patient reports 2-3 months ago he was stretching his shoulder (forward flexion with arms crossed) when he heard a pop in the L shoulder. At that time he was concerned that he dislocated but he heard a second pop and no limited range of motion so he watched it. 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 He denies any specific movements as a cause for pain and notes that all movements are painful when lifting. He notes pain extending into trapezius and his L flank. He denies any numbness or tingling in the arm or hand. He denies any prior surgeries or swelling along the shoulder. He denies any skin changes as well. Denies weakness.

## 2021-12-10 ENCOUNTER — TRANSCRIPTION ENCOUNTER (OUTPATIENT)
Age: 26
End: 2021-12-10

## 2022-11-09 PROBLEM — Z00.00 ENCOUNTER FOR PREVENTIVE HEALTH EXAMINATION: Status: ACTIVE | Noted: 2022-11-09

## 2022-11-17 ENCOUNTER — APPOINTMENT (OUTPATIENT)
Dept: NEUROSURGERY | Facility: CLINIC | Age: 27
End: 2022-11-17

## 2023-01-12 ENCOUNTER — APPOINTMENT (OUTPATIENT)
Dept: NEUROSURGERY | Facility: CLINIC | Age: 28
End: 2023-01-12

## 2023-01-13 ENCOUNTER — APPOINTMENT (OUTPATIENT)
Dept: NEUROSURGERY | Facility: CLINIC | Age: 28
End: 2023-01-13
Payer: MEDICAID

## 2023-01-13 VITALS
OXYGEN SATURATION: 97 % | SYSTOLIC BLOOD PRESSURE: 114 MMHG | DIASTOLIC BLOOD PRESSURE: 73 MMHG | WEIGHT: 175 LBS | HEIGHT: 71 IN | BODY MASS INDEX: 24.5 KG/M2 | HEART RATE: 98 BPM

## 2023-01-13 DIAGNOSIS — M54.2 CERVICALGIA: ICD-10-CM

## 2023-01-13 DIAGNOSIS — M54.50 LOW BACK PAIN, UNSPECIFIED: ICD-10-CM

## 2023-01-13 DIAGNOSIS — G93.0 CEREBRAL CYSTS: ICD-10-CM

## 2023-01-13 PROCEDURE — 99204 OFFICE O/P NEW MOD 45 MIN: CPT

## 2023-01-19 NOTE — PHYSICAL EXAM
[General Appearance - Alert] : alert [General Appearance - In No Acute Distress] : in no acute distress [General Appearance - Well Nourished] : well nourished [General Appearance - Well-Appearing] : healthy appearing [Oriented To Time, Place, And Person] : oriented to person, place, and time [Impaired Insight] : insight and judgment were intact [Affect] : the affect was normal [Memory Recent] : recent memory was not impaired [Person] : oriented to person [Place] : oriented to place [Time] : oriented to time [Short Term Intact] : short term memory intact [Cranial Nerves Optic (II)] : visual acuity intact bilaterally,  pupils equal round and reactive to light [Cranial Nerves Oculomotor (III)] : extraocular motion intact [Cranial Nerves Trigeminal (V)] : facial sensation intact symmetrically [Cranial Nerves Facial (VII)] : face symmetrical [Cranial Nerves Vestibulocochlear (VIII)] : hearing was intact bilaterally [Cranial Nerves Accessory (XI - Cranial And Spinal)] : head turning and shoulder shrug symmetric [Cranial Nerves Hypoglossal (XII)] : there was no tongue deviation with protrusion [Balance] : balance was intact [Sclera] : the sclera and conjunctiva were normal [PERRL With Normal Accommodation] : pupils were equal in size, round, reactive to light, with normal accommodation [Outer Ear] : the ears and nose were normal in appearance [Both Tympanic Membranes Were Examined] : both tympanic membranes were normal [Neck Appearance] : the appearance of the neck was normal [] : no respiratory distress [Respiration, Rhythm And Depth] : normal respiratory rhythm and effort [Apical Impulse] : the apical impulse was normal [Heart Rate And Rhythm] : heart rate was normal and rhythm regular [Arterial Pulses Carotid] : carotid pulses were normal with no bruits [Abdominal Aorta] : the abdominal aorta was normal [No CVA Tenderness] : no ~M costovertebral angle tenderness [No Spinal Tenderness] : no spinal tenderness [Abnormal Walk] : normal gait [Skin Color & Pigmentation] : normal skin color and pigmentation [Motor Tone] : muscle tone was normal in all four extremities [Motor Strength] : muscle strength was normal in all four extremities [FreeTextEntry6] : no drift

## 2023-01-19 NOTE — RESULTS/DATA
[FreeTextEntry1] : 7/27/2022 [facility]  CT head:  20x13 mm right anterior tip temporal arachnoid cyst.

## 2023-01-19 NOTE — REVIEW OF SYSTEMS
[Arm Weakness] : arm weakness [Hand Weakness] :  hand weakness [Numbness] : numbness [Tingling] : tingling [Joint Pain] : joint pain [Limb Pain] : limb pain [Negative] : Integumentary [Cluster Headache] : no cluster headache
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
## 2023-03-06 ENCOUNTER — APPOINTMENT (OUTPATIENT)
Dept: MRI IMAGING | Facility: CLINIC | Age: 28
End: 2023-03-06

## 2023-03-10 ENCOUNTER — APPOINTMENT (OUTPATIENT)
Dept: NEUROSURGERY | Facility: CLINIC | Age: 28
End: 2023-03-10

## 2023-12-06 NOTE — ED ADULT NURSE NOTE - CINV DISCH TEACH PARTICIP
Pre-medications for botox called in. Pt is aware of how to take Macrobid, Valium and Ibuprofen. Patient verbalizes understanding of all of the above, and has no further questions or concerns at this time. Encouraged to call back the office should any questions/concerns arise.  12/6/2023 at 12:49 PM. Patient

## 2024-03-20 NOTE — ED CDU PROVIDER SUBSEQUENT DAY NOTE - CONSTITUTIONAL NEGATIVE STATEMENT, MLM
[Normal Appearance] : normal appearance [General Appearance - Well Nourished] : well nourished [General Appearance - In No Acute Distress] : no acute distress [Normal Conjunctiva] : the conjunctiva exhibited no abnormalities [Auscultation Breath Sounds / Voice Sounds] : lungs were clear to auscultation bilaterally [] : no respiratory distress [Arterial Pulses Normal] : the arterial pulses were normal [Heart Sounds] : normal S1 and S2 [Edema] : no peripheral edema present [Abdomen Tenderness] : non-tender [Abdomen Soft] : soft [Cyanosis, Localized] : no localized cyanosis [Oriented To Time, Place, And Person] : oriented to person, place, and time [Affect] : the affect was normal [FreeTextEntry1] : No edema pulses 2+ to the dorsalis pedis radial pulses are difficult to feel bilaterally but brachial pulses are strong 2+ bilaterally no fever and no chills.

## 2024-10-29 NOTE — ED ADULT NURSE NOTE - CAS DISCH BELONGINGS RETURNED
Orthopedic Progress Note    Patient is status post left total knee arthroplasty, POD # 3  Patient seen and examined at bedside.  Patient's daughter (Davie) at bedside and translating for patient in Upper sorbian.  Patient denies CP, SOB, dizziness, HA, N/V/D.  Patient reports left knee pain this morning.    Vital Signs Last 24 Hrs  T(C): 37.6 (29 Oct 2024 04:04), Max: 37.6 (29 Oct 2024 04:04)  T(F): 99.6 (29 Oct 2024 04:04), Max: 99.6 (29 Oct 2024 04:04)  HR: 74 (29 Oct 2024 04:04) (72 - 119)  BP: 121/78 (29 Oct 2024 04:04) (113/66 - 143/84)  BP(mean): --  RR: 18 (29 Oct 2024 04:04) (18 - 18)  SpO2: 95% (29 Oct 2024 04:04) (95% - 99%)    Parameters below as of 29 Oct 2024 04:04  Patient On (Oxygen Delivery Method): room air      Exam:  Gen: No acute distress  LLE: Aquacel clean, dry, and intact  Motor intact EHL/FHL/TA/GS  SILT in the distal extremity  + DP pulse  BLE: Calves soft and nontender Yes

## 2024-11-16 NOTE — ED ADULT TRIAGE NOTE - MODE OF ARRIVAL
Walk in
FAMILY HISTORY:  Father  Still living? Unknown  Family history of CVA, Age at diagnosis: Age Unknown  Family history of heart attack, Age at diagnosis: Age Unknown    Mother  Still living? Unknown  FH: pancreatic cancer, Age at diagnosis: Age Unknown